# Patient Record
Sex: FEMALE | Race: BLACK OR AFRICAN AMERICAN | NOT HISPANIC OR LATINO | Employment: STUDENT | URBAN - METROPOLITAN AREA
[De-identification: names, ages, dates, MRNs, and addresses within clinical notes are randomized per-mention and may not be internally consistent; named-entity substitution may affect disease eponyms.]

---
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
## 2025-02-11 ENCOUNTER — OFFICE VISIT (OUTPATIENT)
Dept: URGENT CARE | Facility: CLINIC | Age: 20
End: 2025-02-11
Payer: MEDICAID

## 2025-02-11 VITALS
WEIGHT: 130 LBS | SYSTOLIC BLOOD PRESSURE: 137 MMHG | TEMPERATURE: 99 F | OXYGEN SATURATION: 100 % | RESPIRATION RATE: 20 BRPM | HEIGHT: 64 IN | HEART RATE: 65 BPM | DIASTOLIC BLOOD PRESSURE: 68 MMHG | BODY MASS INDEX: 22.2 KG/M2

## 2025-02-11 DIAGNOSIS — Z20.822 EXPOSURE TO COVID-19 VIRUS: ICD-10-CM

## 2025-02-11 DIAGNOSIS — R50.9 FEVER, UNSPECIFIED FEVER CAUSE: Primary | ICD-10-CM

## 2025-02-11 DIAGNOSIS — U07.1 COVID-19: ICD-10-CM

## 2025-02-11 LAB
CTP QC/QA: YES
CTP QC/QA: YES
POC MOLECULAR INFLUENZA A AGN: NEGATIVE
POC MOLECULAR INFLUENZA B AGN: NEGATIVE
SARS CORONAVIRUS 2 ANTIGEN: NEGATIVE

## 2025-02-11 PROCEDURE — 87811 SARS-COV-2 COVID19 W/OPTIC: CPT | Mod: QW,S$GLB,, | Performed by: INTERNAL MEDICINE

## 2025-02-11 PROCEDURE — 99213 OFFICE O/P EST LOW 20 MIN: CPT | Mod: S$GLB,,, | Performed by: INTERNAL MEDICINE

## 2025-02-11 NOTE — LETTER
February 11, 2025      [facility] [address] [address]  Phone: [phone]  Fax: [phone]       Patient: Shannon Ferguson   YOB: 2005  Date of Visit: 02/11/2025    To Whom It May Concern:    Balwinder Ferguson  was at [facility] on 02/11/2025. The patient may return to work/school on 02/12/2025 with no restrictions. If you have any questions or concerns, or if I can be of further assistance, please do not hesitate to contact me.    Sincerely,    Gil Webb MA

## 2025-02-11 NOTE — PROGRESS NOTES
"Subjective:      Patient ID: Shannon Ferguson is a 19 y.o. female.    Vitals:  height is 5' 4" (1.626 m) and weight is 59 kg (130 lb). Her oral temperature is 98.9 °F (37.2 °C). Her blood pressure is 137/68 and her pulse is 65. Her respiration is 20 and oxygen saturation is 100%.     Chief Complaint: Cough    This is a 19 y.o. female who presents today with a chief complaint of cough, runny nose, body aches, and hot flashes that began this morning.  Patient took OTC Tylenol with mild relief.  Exposure to covid. No nausea, no vomiting, mild abdominal pain this morning that went away without treatment. Able to tolerate PO. Not sexually active, last cycle 1/25/25    Cough  This is a new problem. The current episode started today. The problem has been gradually worsening. The problem occurs every few hours. The cough is Productive of sputum. Associated symptoms include chills, a fever, myalgias, postnasal drip, rhinorrhea, a sore throat and sweats. Pertinent negatives include no chest pain, ear congestion, ear pain, headaches, nasal congestion, rash, shortness of breath, weight loss or wheezing. Nothing aggravates the symptoms.       Constitution: Positive for chills and fever.   HENT:  Positive for postnasal drip and sore throat. Negative for ear pain.    Cardiovascular:  Negative for chest pain.   Respiratory:  Positive for cough. Negative for shortness of breath and wheezing.    Gastrointestinal:  Positive for abdominal pain (had mild pain this morning, none currently).   Musculoskeletal:  Positive for muscle ache.   Skin:  Negative for rash and erythema.   Neurological:  Negative for headaches.      Objective:     Physical Exam   Constitutional: She is oriented to person, place, and time. She appears well-developed.  Non-toxic appearance. She does not appear ill. No distress.   HENT:   Head: Normocephalic and atraumatic.   Ears:   Right Ear: Tympanic membrane and external ear normal.   Left Ear: Tympanic membrane " and external ear normal.   Nose: Nose normal.   Mouth/Throat: Oropharynx is clear and moist. Mucous membranes are moist. No oropharyngeal exudate. Oropharynx is clear.   Eyes: Conjunctivae and EOM are normal. Pupils are equal, round, and reactive to light. Right eye exhibits no discharge. Left eye exhibits no discharge. No scleral icterus.   Neck: Neck supple.   Cardiovascular: Normal rate, regular rhythm and normal heart sounds.   No murmur heard.Exam reveals no gallop and no friction rub.   Pulmonary/Chest: Effort normal. No respiratory distress. She has no wheezes. She has no rales.   Abdominal: There is no abdominal tenderness. There is no rebound and no guarding.   Lymphadenopathy:     She has no cervical adenopathy.   Neurological: She is alert and oriented to person, place, and time.   Skin: Skin is warm, dry, not diaphoretic and no rash. Capillary refill takes less than 2 seconds. No erythema   Psychiatric: Her behavior is normal.   Nursing note and vitals reviewed.      Assessment:     1. Fever, unspecified fever cause    2. Exposure to COVID-19 virus    3. COVID-19        Plan:       Fever, unspecified fever cause  -     SARS Coronavirus 2 Antigen, POCT Manual Read  -     POCT Influenza A/B MOLECULAR    Exposure to COVID-19 virus    COVID-19    Supportive care discussed.

## 2025-02-11 NOTE — PATIENT INSTRUCTIONS
You have tested positive for covid 19 or have close exposure with symptoms and are a presumptive positive patient.     If you test positive for COVID-19 you may return to normal activities when, for at least 24 hours, both are true:     Your symptoms are getting better overall, and:  You have not had a fever AND are not using fever reducing medication    The CDC also recommends added precautions in the 5 days after return to normal activity including frequent hand washing, mask wearing, physical distancing.      Should you develop a fever or starts to feel worse after you have returned to normal activities, you should return home and away from others for at least another 24 hours.     Use ibuprofen and tylenol for aches/pains fever. I recommend coricidin for cough. Antihistamines and flonase can be helpful for nasal congestion and runny nose. Drink lots of fluids. If you are having difficulty breathing, inability to speak in full sentences, go to the ER immediately.     Patient Education       COVID-19 Discharge Instructions   About this topic   Coronavirus disease 2019 is also known as COVID-19. It is a viral illness that infects the lungs. It is caused by a virus called SARS-associated coronavirus (SARS-CoV-2).  The signs of COVID-19 most often start a few days after you have been infected. In some people, it takes longer to show signs. Others never show signs of the infection. You may have a cough, fever, shaking chills and it may be hard to breathe. You may be very tired, have muscle aches, a headache or sore throat. Some people have an upset stomach or loose stools. Others lose their sense of smell or taste. You may not have these signs all the time and they may come and go while you are sick.  The virus spreads easily through droplets when you talk, sneeze, or cough. You can pass the virus to others when you are talking close together, singing, hugging, sharing food, or shaking hands. Doctors believe the germs  also survive on surfaces like tables, door handles, and telephones. However, this is not a common way that COVID-19 spreads. Doctors believe you can also spread the infection even if you dont have any symptoms, but they do not know how that happens. This is why getting vaccinated is one of the best ways to keep you healthy and slow the spread of the virus.  Some people have a mild case of COVID-19 and are able to stay at home and away from others until they feel better. Others may need to be in the hospital if they are very sick. Some people with COVID-19 can have some symptoms for weeks or months. People with COVID-19 must isolate themselves. You can start to be around others when your doctor says it is safe to do so.       What care is needed at home?   Ask your doctor what you need to do when you go home. Make sure you ask questions if you do not understand what the doctor says.  Drink lots of water, juice, or broth to replace fluids lost from a fever.  You may use cool mist humidifiers to help ease congestion and coughing.  Use 2 to 3 pillows to prop yourself up when you lie down to make it easier to breathe and sleep.  Do not smoke and do not drink beer, wine, and mixed drinks (alcohol).  To lower the chance of passing the infection to others, get a COVID-19 vaccine after your infection has resolved.  If you have not been fully vaccinated:  Wear a mask over your mouth and nose if you are around others who are not sick. Cloth masks work best if they have more than one layer of fabric.  Wash your hands often.  Stay home in a separate room, if possible, away from others. Only go out to get medical care.  Use a separate bathroom if possible.  Do not make food for others.  What follow-up care is needed?   Your doctor may ask you to make visits to the office to check on your progress. Be sure to keep these visits. Make sure you wear a mask at these visits.  If you can, tell the staff you have COVID-19 ahead of time  so they can take extra care to stop the disease from spreading.  It may take a few weeks before your health returns to normal.  What drugs may be needed?   The doctor may order drugs to:  Help with breathing  Help with fever  Help with swelling in your airways and lungs  Control coughing  Ease a sore throat  Help a runny or stuffy nose  Will physical activity be limited?   You may have to limit your physical activity. Talk to your doctor about the right amount of activity for you. If you have been very sick with COVID-19, it can take some time to get your strength back.  Will there be any other care needed?   Doctors do not know how long you can pass the virus on to others after you are sick. This is why it is important to stay in a separate room, if possible, when you are sick. For now, doctors are giving general guidelines for you to follow after you have been sick. Before you go around other people, you should:  Be fever free for 24 hours without taking any drugs to lower the fever  Have no symptoms of cough or shortness of breath  Wait at least 10 days after first having symptoms or your first positive test, and you need to be symptom free as above. Some experts suggest waiting 20 days if you have had a more severe infection.  Talk with your doctor about getting a COVID-19 vaccine.  What problems could happen?   Fluid loss. This is dehydration.  Short-term or long-term lung damage  Heart problems  Death  When do I need to call the doctor?   You are having so much trouble breathing that you can only say one or two words at a time.  You need to sit upright at all times to be able to breathe and/or cannot lie down.  You are very confused or cannot stay awake.  Your lips or skin start to turn blue or grey.  You think you might be having a medical emergency. Some examples of medical emergencies are:  Severe chest pain.  Not able to speak or move normally.  You have trouble breathing when talking or sitting  still.  You have new shortness of breath.  You become weak or dizzy.  You have very dark urine or do not pass urine for more than 8 hours.  You have new or worsening COVID-19 symptoms like:  Fever  Cough  Feeling very tired  Shaking chills  Headache  Trouble swallowing  Throwing up  Loose stools  Reddish purple spots on your fingers or toes  Teach Back: Helping You Understand   The Teach Back Method helps you understand the information we are giving you. After you talk with the staff, tell them in your own words what you learned. This helps to make sure the staff has described each thing clearly. It also helps to explain things that may have been confusing. Before going home, make sure you can do these:  I can tell you about my condition.  I can tell you what may help ease my breathing.  I can tell you what I can do to help avoid passing the infection to others.  I can tell you what I will do if I have trouble breathing; feel sleepy or confused; or my fingertips, fingernails, skin, or lips are blue.  Where can I learn more?   Centers for Disease Control and Prevention  https://www.cdc.gov/coronavirus/2019-ncov/about/index.html   Centers for Disease Control and Prevention  https://www.cdc.gov/coronavirus/2019-ncov/hcp/disposition-in-home-patients.html   World Health Organization  https://www.who.int/news-room/q-a-detail/o-c-qoikwigyivjea   Last Reviewed Date   2021-10-05  Consumer Information Use and Disclaimer   This information is not specific medical advice and does not replace information you receive from your health care provider. This is only a brief summary of general information. It does NOT include all information about conditions, illnesses, injuries, tests, procedures, treatments, therapies, discharge instructions or life-style choices that may apply to you. You must talk with your health care provider for complete information about your health and treatment options. This information should not be used to  decide whether or not to accept your health care providers advice, instructions or recommendations. Only your health care provider has the knowledge and training to provide advice that is right for you.  Copyright   Copyright © 2021 [employer] and its affiliates and/or licensors. All rights reserved.

## 2025-04-04 ENCOUNTER — OFFICE VISIT (OUTPATIENT)
Dept: URGENT CARE | Facility: CLINIC | Age: 20
End: 2025-04-04
Payer: MEDICAID

## 2025-04-04 VITALS
OXYGEN SATURATION: 99 % | TEMPERATURE: 98 F | SYSTOLIC BLOOD PRESSURE: 111 MMHG | WEIGHT: 130.06 LBS | DIASTOLIC BLOOD PRESSURE: 61 MMHG | HEART RATE: 71 BPM | HEIGHT: 64 IN | RESPIRATION RATE: 20 BRPM | BODY MASS INDEX: 22.2 KG/M2

## 2025-04-04 DIAGNOSIS — K59.00 CONSTIPATION, UNSPECIFIED CONSTIPATION TYPE: Primary | ICD-10-CM

## 2025-04-04 DIAGNOSIS — R10.84 GENERALIZED ABDOMINAL PAIN: ICD-10-CM

## 2025-04-04 LAB
B-HCG UR QL: NEGATIVE
BILIRUBIN, UA POC OHS: NEGATIVE
BLOOD, UA POC OHS: ABNORMAL
CLARITY, UA POC OHS: CLEAR
COLOR, UA POC OHS: YELLOW
CTP QC/QA: YES
GLUCOSE, UA POC OHS: NEGATIVE
KETONES, UA POC OHS: 40
LEUKOCYTES, UA POC OHS: NEGATIVE
NITRITE, UA POC OHS: NEGATIVE
PH, UA POC OHS: 6
PROTEIN, UA POC OHS: NEGATIVE
SPECIFIC GRAVITY, UA POC OHS: >=1.03
UROBILINOGEN, UA POC OHS: 0.2

## 2025-04-04 PROCEDURE — 99214 OFFICE O/P EST MOD 30 MIN: CPT | Mod: S$GLB,,, | Performed by: FAMILY MEDICINE

## 2025-04-04 PROCEDURE — 81003 URINALYSIS AUTO W/O SCOPE: CPT | Mod: QW,S$GLB,, | Performed by: FAMILY MEDICINE

## 2025-04-04 PROCEDURE — 74019 RADEX ABDOMEN 2 VIEWS: CPT | Mod: S$GLB,,, | Performed by: RADIOLOGY

## 2025-04-04 PROCEDURE — 81025 URINE PREGNANCY TEST: CPT | Mod: S$GLB,,, | Performed by: FAMILY MEDICINE

## 2025-04-04 RX ORDER — SYRING-NEEDL,DISP,INSUL,0.3 ML 29 G X1/2"
296 SYRINGE, EMPTY DISPOSABLE MISCELLANEOUS ONCE
Qty: 296 ML | Refills: 1 | Status: SHIPPED | OUTPATIENT
Start: 2025-04-04 | End: 2025-04-04

## 2025-04-04 NOTE — PATIENT INSTRUCTIONS
With a history of constipation and the large amount of stool seen on your x-ray your abdominal pain could be due to the constipation.      Let us go ahead and treat your constipation more aggressively than what he had already tried to do.     You can take magnesium citrate 1 bottle by mouth today to induce a bowel movement.    If you do not induce a bowel movement by tomorrow then I recommend using an  over-the-counter.bisocodyl suppository or an enema,      If that is not helping, or If you have any worsening abdominal pain or other w worrisome symptoms, I recommend that you go to the ER for further evaluation and management.

## 2025-04-04 NOTE — LETTER
April 4, 2025      [facility] [address] [address]  Phone: [phone]  Fax: [phone]       Patient: Shannon Ferguson   YOB: 2005  Date of Visit: 04/04/2025    To Whom It May Concern:    Balwinder Ferguson  was at [facility] on 04/04/2025. The patient may return to work/school on 4/7/25. If you have any questions or concerns, or if I can be of further assistance, please do not hesitate to contact me.    Sincerely,    Chuyita Ignacio, DO

## 2025-04-04 NOTE — PROGRESS NOTES
"Subjective:      Patient ID: Shannon Ferguson is a 19 y.o. female.    Vitals:  height is 5' 4" (1.626 m) and weight is 59 kg (130 lb 1.1 oz). Her oral temperature is 97.6 °F (36.4 °C). Her blood pressure is 111/61 and her pulse is 71. Her respiration is 20 and oxygen saturation is 99%.     Chief Complaint: Abdominal Pain    19 year old female c/o mild-to-moderate abdominal pain, congestion, and vomiting do episodes earlier today. Pt states having stomach pain and vomiting this morning, and early afternoon. Pt states nausea, pt states took tums around 10am.  She had not had a bowel movement in about 5 days.  This is abnormal for her.  She typically has bowel movements more frequently.  She reports no new medications that could have caused acute constipation.  No fever no blood in emesis.  No dysuria or hematuria or vaginal discharge reported        Abdominal Pain  This is a new problem. The current episode started today. The onset quality is sudden. The problem occurs intermittently. The problem has been unchanged. The pain is located in the generalized abdominal region and epigastric region. The pain is at a severity of 5/10. The pain is moderate. The quality of the pain is sharp. The abdominal pain does not radiate. Associated symptoms include constipation, frequency, nausea and vomiting. Pertinent negatives include no diarrhea, dysuria, fever, headaches or hematuria. Nothing aggravates the pain. She has tried antacids for the symptoms. The treatment provided no relief. There is no history of abdominal surgery or GERD. Patient's medical history does not include kidney stones and UTI.     Constitution: Negative for fever.   Gastrointestinal:  Positive for abdominal pain, nausea, vomiting and constipation. Negative for history of abdominal surgery and diarrhea.   Genitourinary:  Positive for frequency. Negative for dysuria and hematuria.   Neurological:  Negative for headaches.      Objective:     Physical " Exam  Constitutional: Pt oriented to person, place, and time.  Non-toxic appearance.   Patient does not appear ill. No distress. normal  HENT: No icterus or facial swelling appreciated  Head: Normocephalic and atraumatic.   Nose: No congestion.   Pulmonary/Chest: Effort normal. No stridor. No respiratory distress.   Abdominal: Normal appearance. Abdomen exhibits no distension.  Nontender.  No rebound or guarding.  No masses palpated  Musculoskeletal:         General: No swelling.   Neurological: no focal deficit. Patient is alert and oriented to person, place, and time.   Skin: Skin is not diaphoretic and not pale. no jaundice  Psychiatric: Patients behavior is normal. Mood, judgment and thought content normal.     XR ABDOMEN FLAT AND ERECT  Result Date: 4/4/2025  EXAMINATION: XR ABDOMEN FLAT AND ERECT CLINICAL HISTORY: Generalized abdominal pain TECHNIQUE: Flat and erect AP views of the abdomen were performed. COMPARISON: Report only from abdominal ultrasound 03/04/2021 FINDINGS: Moderate amount of scattered fecal material throughout the colon and rectum.  Otherwise nonobstructive bowel gas pattern.  No organomegaly or significant mass effect.  No free air or abnormal intra-abdominal calcification seen.  Imaged lung bases are clear.  Osseous structures show slight levocurvature of the lumbar spine with partial osseous fusion and chronic appearing potential developmental vertebral anomaly at L4 and L5 levels.  No acute or destructive osseous process seen.     Moderate colonic and rectal stool burden may reflect constipation.  Otherwise nonobstructive bowel gas pattern. Electronically signed by: Jozef Patel MD Date:    04/04/2025 Time:    17:13      Assessment:     1. Constipation, unspecified constipation type    2. Generalized abdominal pain        Plan:       Constipation, unspecified constipation type  -     XR ABDOMEN FLAT AND ERECT; Future; Expected date: 04/04/2025  -     magnesium citrate solution; Take 296  mLs by mouth once. For constipation for 1 dose  Dispense: 296 mL; Refill: 1    Generalized abdominal pain  -     POCT Urinalysis(Instrument)  -     POCT urine pregnancy  -     XR ABDOMEN FLAT AND ERECT; Future; Expected date: 04/04/2025  -     magnesium citrate solution; Take 296 mLs by mouth once. For constipation for 1 dose  Dispense: 296 mL; Refill: 1      Patient Instructions   With a history of constipation and the large amount of stool seen on your x-ray your abdominal pain could be due to the constipation.      Let us go ahead and treat your constipation more aggressively than what he had already tried to do.     You can take magnesium citrate 1 bottle by mouth today to induce a bowel movement.    If you do not induce a bowel movement by tomorrow then I recommend using an  over-the-counter.bisocodyl suppository or an enema,      If that is not helping, or If you have any worsening abdominal pain or other w worrisome symptoms, I recommend that you go to the ER for further evaluation and management.